# Patient Record
Sex: MALE | Employment: UNEMPLOYED | URBAN - METROPOLITAN AREA
[De-identification: names, ages, dates, MRNs, and addresses within clinical notes are randomized per-mention and may not be internally consistent; named-entity substitution may affect disease eponyms.]

---

## 2017-06-06 ENCOUNTER — OFFICE VISIT (OUTPATIENT)
Dept: INTERNAL MEDICINE CLINIC | Age: 22
End: 2017-06-06

## 2017-06-06 VITALS
HEART RATE: 102 BPM | WEIGHT: 158.8 LBS | RESPIRATION RATE: 18 BRPM | DIASTOLIC BLOOD PRESSURE: 72 MMHG | HEIGHT: 73 IN | SYSTOLIC BLOOD PRESSURE: 114 MMHG | TEMPERATURE: 98 F | BODY MASS INDEX: 21.05 KG/M2 | OXYGEN SATURATION: 96 %

## 2017-06-06 DIAGNOSIS — J30.1 SEASONAL ALLERGIC RHINITIS DUE TO POLLEN: ICD-10-CM

## 2017-06-06 DIAGNOSIS — Z00.00 VISIT FOR WELL MAN HEALTH CHECK: Primary | ICD-10-CM

## 2017-06-06 RX ORDER — ZINC GLUCONATE 10 MG
LOZENGE ORAL
COMMUNITY

## 2017-06-06 RX ORDER — MONTELUKAST SODIUM 10 MG/1
TABLET ORAL
Refills: 2 | COMMUNITY
Start: 2017-06-01

## 2017-06-06 RX ORDER — MINERAL OIL
ENEMA (ML) RECTAL
COMMUNITY

## 2017-06-06 RX ORDER — FLUTICASONE PROPIONATE 50 MCG
2 SPRAY, SUSPENSION (ML) NASAL DAILY
COMMUNITY

## 2017-06-06 NOTE — PROGRESS NOTES
HPI   Roberta Arguelles is a 25 y.o. male, he presents today for:    Graduated from [employer], recently but didn't feel it was as big of deal.   [employer] to start business degree. In a good relationship with counselor for depression. Has tried medicines a few times but felt like he was a zombie. Starting to recognize symptoms. Grandfather with alzheimer, no bipolar no schizophrenia. Drinks alcohol 1-2 times per week. No marijuana nor other substances. To be living in apartment. PMH/PSH: reviewed and updated  Sochx:  reports that he has never smoked. He has never used smokeless tobacco. He reports that he drinks alcohol. He reports that he does not use illicit drugs. Famhx: reviewed and updated     All: Allergies   Allergen Reactions    Sulfa (Sulfonamide Antibiotics) Hives     Med:   Current Outpatient Prescriptions   Medication Sig    montelukast (SINGULAIR) 10 mg tablet TK 1 T PO HS    MULTIVIT-MINS/IRON/FOLIC/LYCOP (CENTRUM MEN PO) Take  by mouth.  magnesium 250 mg tab Take  by mouth.  fluticasone (FLONASE) 50 mcg/actuation nasal spray 2 Sprays by Both Nostrils route daily.  fexofenadine (ALLEGRA) 180 mg tablet Take  by mouth.  Methylcellulose, with Sugar, (CITRUCEL, SUCROSE,) powd Take  by mouth. No current facility-administered medications for this visit. Review of Systems   Constitutional: Negative for chills, fever and malaise/fatigue. Respiratory: Negative for shortness of breath. Cardiovascular: Negative for chest pain. PE:  Blood pressure 114/72, pulse (!) 102, temperature 98 °F (36.7 °C), temperature source Oral, resp. rate 18, height 6' 1\" (1.854 m), weight 158 lb 12.8 oz (72 kg), SpO2 96 %. Body mass index is 20.95 kg/(m^2). Physical Exam   Constitutional: He is oriented to person, place, and time. He appears well-developed and well-nourished. No distress. HENT:   Head: Normocephalic.    Mouth/Throat: Oropharynx is clear and moist.   Eyes: Conjunctivae and EOM are normal. Pupils are equal, round, and reactive to light. Neck: Neck supple. Cardiovascular: Normal rate, regular rhythm, normal heart sounds and intact distal pulses. Pulmonary/Chest: Effort normal and breath sounds normal.   Abdominal: Soft. Musculoskeletal: He exhibits no edema. Neurological: He is alert and oriented to person, place, and time. Nursing note and vitals reviewed. A/P:  25 y.o. male    ICD-10-CM ICD-9-CM    1. Visit for well man health check Z00.00 V70.0      Well man exam: history and exam revealed issues as noted below. Vaccine status: up to date. Reviewed recommendation for HPV and Meningitis B  Cardiovascular risk: BP well controlled. Weight/Diet and Exercise: BMI at goal.   STD screen: declined STD screening, recommended and explained recommendation    Allergic rhinitis: moderately well controlled with nasal steroid, montelukast and antihistamine. However still gets a few sinus infections per year. Discussed possibility    Follow-up Disposition:  Return in about 1 year (around 6/6/2018) for well visit, or earlier if allergies not well controlled. Lorenso Frankel

## 2017-06-06 NOTE — MR AVS SNAPSHOT
Visit Information Date & Time Provider Department Dept. Phone Encounter #  
 6/6/2017  1:30 PM Ozzy Ruff MD [address] [facility] [phone] [id number] Follow-up Instructions Return in about 1 year (around 6/6/2018) for well visit, or earlier if allergies not well controlled. Chales Minus Upcoming Health Maintenance Date Due  
 HPV AGE 9Y-34Y (1 of 3 - Male 3 Dose Series) 2/11/2007 INFLUENZA AGE 9 TO ADULT 8/1/2017 DTaP/Tdap/Td series (7 - Td) 6/17/2024 Allergies as of 6/6/2017  Review Complete On: 6/6/2017 By: Suni Coffey LPN Severity Noted Reaction Type Reactions Sulfa (Sulfonamide Antibiotics)  06/06/2017    Hives Current Immunizations  Reviewed on 6/6/2017 Name Date DTaP 4/12/2006, 2/19/2001, 8/15/1997, 1996, 1996 Hep A Vaccine 6/17/2014, 8/30/2013 Hep B Vaccine 1996, 1996, 1996 Hib 5/16/1997, 1996, 1996, 1996 MMR 2/19/2001, 5/16/1997 Meningococcal (MCV4O) Vaccine 6/17/2014, 3/24/2008 Poliovirus vaccine 2/19/2001, 8/15/1997, 1996, 1996 TB Skin Test (PPD) 2/19/2001 Tdap 6/17/2014, 3/30/2007 Varicella Virus Vaccine 3/24/2008, 5/16/1997 Reviewed by Suni Coffey LPN on 1/0/7345 at  8:17 PM  
Vitals BP Pulse Temp Resp Height(growth percentile) Weight(growth percentile) 114/72 (!) 102 98 °F (36.7 °C) (Oral) 18 6' 1\" (1.854 m) 158 lb 12.8 oz (72 kg) SpO2 BMI Smoking Status 96% 20.95 kg/m2 Never Smoker Vitals History BMI and BSA Data Body Mass Index Body Surface Area  
 20.95 kg/m 2 1.93 m 2 Preferred Pharmacy Pharmacy Name Phone [address], [facility] [address] AT [facility] [place] [facility] [phone] Your Updated Medication List  
  
   
This list is accurate as of: 6/6/17  2:05 PM.  Always use your most recent med list.  
  
  
  
  
 CENTRUM MEN PO Take  by mouth. CITRUCEL (SUCROSE) Powd Generic drug:  Methylcellulose (with Sugar) Take  by mouth. fexofenadine 180 mg tablet Commonly known as:  Jazmin Don Take  by mouth. FLONASE 50 mcg/actuation nasal spray Generic drug:  fluticasone 2 Sprays by Both Nostrils route daily. magnesium 250 mg Tab Take  by mouth.  
  
 montelukast 10 mg tablet Commonly known as:  SINGULAIR TK 1 T PO HS Follow-up Instructions Return in about 1 year (around 6/6/2018) for well visit, or earlier if allergies not well controlled. Genet Romo Patient Instructions Well Visit, Ages 25 to 48: Care Instructions Your Care Instructions Physical exams can help you stay healthy. Your doctor has checked your overall health and may have suggested ways to take good care of yourself. He or she also may have recommended tests. At home, you can help prevent illness with healthy eating, regular exercise, and other steps. Follow-up care is a key part of your treatment and safety. Be sure to make and go to all appointments, and call your doctor if you are having problems. It's also a good idea to know your test results and keep a list of the medicines you take. How can you care for yourself at home? · Reach and stay at a healthy weight. This will lower your risk for many problems, such as obesity, diabetes, heart disease, and high blood pressure. · Get at least 30 minutes of physical activity on most days of the week. Walking is a good choice. You also may want to do other activities, such as running, swimming, cycling, or playing tennis or team sports. Discuss any changes in your exercise program with your doctor. · Do not smoke or allow others to smoke around you. If you need help quitting, talk to your doctor about stop-smoking programs and medicines. These can increase your chances of quitting for good. · Talk to your doctor about whether you have any risk factors for sexually transmitted infections (STIs). Having one sex partner (who does not have STIs and does not have sex with anyone else) is a good way to avoid these infections. · Use birth control if you do not want to have children at this time. Talk with your doctor about the choices available and what might be best for you. · Protect your skin from too much sun. When you're outdoors from 10 a.m. to 4 p.m., stay in the shade or cover up with clothing and a hat with a wide brim. Wear sunglasses that block UV rays. Even when it's cloudy, put broad-spectrum sunscreen (SPF 30 or higher) on any exposed skin. · See a dentist one or two times a year for checkups and to have your teeth cleaned. · Wear a seat belt in the car. · Drink alcohol in moderation, if at all. That means no more than 2 drinks a day for men and 1 drink a day for women. Follow your doctor's advice about when to have certain tests. These tests can spot problems early. For everyone · Cholesterol. Have the fat (cholesterol) in your blood tested after age 21. Your doctor will tell you how often to have this done based on your age, family history, or other things that can increase your risk for heart disease. · Blood pressure. Have your blood pressure checked during a routine doctor visit. Your doctor will tell you how often to check your blood pressure based on your age, your blood pressure results, and other factors. · Vision. Talk with your doctor about how often to have a glaucoma test. 
· Diabetes. Ask your doctor whether you should have tests for diabetes. · Colon cancer. Have a test for colon cancer at age 48. You may have one of several tests. If you are younger than 48, you may need a test earlier if you have any risk factors.  Risk factors include whether you already had a precancerous polyp removed from your colon or whether your parent, brother, sister, or child has had colon cancer. For women · Breast exam and mammogram. Talk to your doctor about when you should have a clinical breast exam and a mammogram. Medical experts differ on whether and how often women under 50 should have these tests. Your doctor can help you decide what is right for you. · Pap test and pelvic exam. Begin Pap tests at age 24. A Pap test is the best way to find cervical cancer. The test often is part of a pelvic exam. Ask how often to have this test. 
· Tests for sexually transmitted infections (STIs). Ask whether you should have tests for STIs. You may be at risk if you have sex with more than one person, especially if your partners do not wear condoms. For men · Tests for sexually transmitted infections (STIs). Ask whether you should have tests for STIs. You may be at risk if you have sex with more than one person, especially if you do not wear a condom. · Testicular cancer exam. Ask your doctor whether you should check your testicles regularly. · Prostate exam. Talk to your doctor about whether you should have a blood test (called a PSA test) for prostate cancer. Experts differ on whether and when men should have this test. Some experts suggest it if you are older than 39 and are -American or have a father or brother who got prostate cancer when he was younger than 72. When should you call for help? Watch closely for changes in your health, and be sure to contact your doctor if you have any problems or symptoms that concern you. Where can you learn more? Go to http://reynaldo-joan.info/. Enter P072 in the search box to learn more about \"Well Visit, Ages 25 to 48: Care Instructions. \" Current as of: July 19, 2016 Content Version: 11.2 © 5909-7937 [employer].  Care instructions adapted under license by [employer] (which disclaims liability or warranty for this information). If you have questions about a medical condition or this instruction, always ask your healthcare professional. [address] any warranty or liability for your use of this information. Introducing [facility] SERVICES! Ruth Ellis introduces [employer] patient portal. Now you can access parts of your medical record, email your doctor's office, and request medication refills online. 1. In your internet browser, go to [email or web address] 2. Click on the First Time User? Click Here link in the Sign In box. You will see the New Member Sign Up page. 3. Enter your [employer] Access Code exactly as it appears below. You will not need to use this code after youve completed the sign-up process. If you do not sign up before the expiration date, you must request a new code. · [employer] Access Code: [email or web address] Expires: 9/4/2017  1:30 PM 
 
4. Enter the last four digits of your Social Security Number (xxxx) and Date of Birth (mm/dd/yyyy) as indicated and click Submit. You will be taken to the next sign-up page. 5. Create a Know[employer]na ID. This will be your Know[employer] login ID and cannot be changed, so think of one that is secure and easy to remember. 6. Create a Know[employer] password. You can change your password at any time. 7. Enter your Password Reset Question and Answer. This can be used at a later time if you forget your password. 8. Enter your e-mail address. You will receive e-mail notification when new information is available in [address] 9. Click Sign Up. You can now view and download portions of your medical record. 10. Click the Download Summary menu link to download a portable copy of your medical information. If you have questions, please visit the Frequently Asked Questions section of the [employer] website. Remember, [employer] is NOT to be used for urgent needs. For medical emergencies, dial 911. Now available from your iPhone and Android! Please provide this summary of care documentation to your next provider. Your primary care clinician is listed as Cheryl Rodrigez. If you have any questions after today's visit, please call [phone].

## 2017-06-06 NOTE — PATIENT INSTRUCTIONS

## 2017-06-06 NOTE — PROGRESS NOTES
Chief Complaint   Patient presents with    New Patient    Physical     PHQ over the last two weeks 6/6/2017   PHQ Not Done Active Diagnosis of Depression or Bipolar Disorder

## 2017-06-21 ENCOUNTER — TELEPHONE (OUTPATIENT)
Dept: INTERNAL MEDICINE CLINIC | Age: 22
End: 2017-06-21

## 2017-06-21 NOTE — TELEPHONE ENCOUNTER
That were completed at last visit needed to be corrected. She took the form with the incorrect date crossed out but left a blank form just in case the school needs a new form completed. She also signed a medical release form in case she needs a new copy faxed to the school.